# Patient Record
Sex: FEMALE | Race: OTHER
[De-identification: names, ages, dates, MRNs, and addresses within clinical notes are randomized per-mention and may not be internally consistent; named-entity substitution may affect disease eponyms.]

---

## 2019-07-08 ENCOUNTER — HOSPITAL ENCOUNTER (INPATIENT)
Dept: HOSPITAL 15 - ER | Age: 44
LOS: 3 days | Discharge: HOME | DRG: 872 | End: 2019-07-11
Attending: FAMILY MEDICINE | Admitting: NURSE PRACTITIONER
Payer: COMMERCIAL

## 2019-07-08 VITALS — DIASTOLIC BLOOD PRESSURE: 98 MMHG | SYSTOLIC BLOOD PRESSURE: 158 MMHG

## 2019-07-08 VITALS — DIASTOLIC BLOOD PRESSURE: 97 MMHG | SYSTOLIC BLOOD PRESSURE: 172 MMHG

## 2019-07-08 VITALS — SYSTOLIC BLOOD PRESSURE: 158 MMHG | DIASTOLIC BLOOD PRESSURE: 98 MMHG

## 2019-07-08 VITALS — WEIGHT: 293 LBS | HEIGHT: 64 IN | BODY MASS INDEX: 50.02 KG/M2

## 2019-07-08 DIAGNOSIS — J01.90: ICD-10-CM

## 2019-07-08 DIAGNOSIS — Z87.01: ICD-10-CM

## 2019-07-08 DIAGNOSIS — Z88.1: ICD-10-CM

## 2019-07-08 DIAGNOSIS — J45.909: ICD-10-CM

## 2019-07-08 DIAGNOSIS — A41.9: Primary | ICD-10-CM

## 2019-07-08 DIAGNOSIS — E11.9: ICD-10-CM

## 2019-07-08 DIAGNOSIS — E66.01: ICD-10-CM

## 2019-07-08 DIAGNOSIS — E78.00: ICD-10-CM

## 2019-07-08 DIAGNOSIS — I10: ICD-10-CM

## 2019-07-08 DIAGNOSIS — B95.62: ICD-10-CM

## 2019-07-08 DIAGNOSIS — Z79.84: ICD-10-CM

## 2019-07-08 DIAGNOSIS — L03.211: ICD-10-CM

## 2019-07-08 DIAGNOSIS — J32.0: ICD-10-CM

## 2019-07-08 LAB
ALBUMIN SERPL-MCNC: 3.3 G/DL (ref 3.4–5)
ALP SERPL-CCNC: 78 U/L (ref 45–117)
ALT SERPL-CCNC: 26 U/L (ref 13–56)
ANION GAP SERPL CALCULATED.3IONS-SCNC: 8 MMOL/L (ref 5–15)
BILIRUB SERPL-MCNC: 0.4 MG/DL (ref 0.2–1)
BUN SERPL-MCNC: 13 MG/DL (ref 7–18)
BUN/CREAT SERPL: 16.3
CALCIUM SERPL-MCNC: 8.4 MG/DL (ref 8.5–10.1)
CHLORIDE SERPL-SCNC: 103 MMOL/L (ref 98–107)
CO2 SERPL-SCNC: 26 MMOL/L (ref 21–32)
GLUCOSE SERPL-MCNC: 350 MG/DL (ref 74–106)
HCT VFR BLD AUTO: 41.9 % (ref 36–46)
HGB BLD-MCNC: 13.7 G/DL (ref 12.2–16.2)
LACTATE PLASV-SCNC: 2.7 MMOL/L (ref 0.4–2)
MCH RBC QN AUTO: 28.4 PG (ref 28–32)
MCV RBC AUTO: 86.7 FL (ref 80–100)
NRBC BLD QL AUTO: 0.1 %
POTASSIUM SERPL-SCNC: 4.2 MMOL/L (ref 3.5–5.1)
PROT SERPL-MCNC: 7 G/DL (ref 6.4–8.2)
SODIUM SERPL-SCNC: 137 MMOL/L (ref 136–145)

## 2019-07-08 PROCEDURE — 81025 URINE PREGNANCY TEST: CPT

## 2019-07-08 PROCEDURE — 70450 CT HEAD/BRAIN W/O DYE: CPT

## 2019-07-08 PROCEDURE — 83605 ASSAY OF LACTIC ACID: CPT

## 2019-07-08 PROCEDURE — 87077 CULTURE AEROBIC IDENTIFY: CPT

## 2019-07-08 PROCEDURE — 96372 THER/PROPH/DIAG INJ SC/IM: CPT

## 2019-07-08 PROCEDURE — 83036 HEMOGLOBIN GLYCOSYLATED A1C: CPT

## 2019-07-08 PROCEDURE — 87186 SC STD MICRODIL/AGAR DIL: CPT

## 2019-07-08 PROCEDURE — 87040 BLOOD CULTURE FOR BACTERIA: CPT

## 2019-07-08 PROCEDURE — 80053 COMPREHEN METABOLIC PANEL: CPT

## 2019-07-08 PROCEDURE — 70486 CT MAXILLOFACIAL W/O DYE: CPT

## 2019-07-08 PROCEDURE — 96365 THER/PROPH/DIAG IV INF INIT: CPT

## 2019-07-08 PROCEDURE — 82962 GLUCOSE BLOOD TEST: CPT

## 2019-07-08 PROCEDURE — 81001 URINALYSIS AUTO W/SCOPE: CPT

## 2019-07-08 PROCEDURE — 36415 COLL VENOUS BLD VENIPUNCTURE: CPT

## 2019-07-08 PROCEDURE — 82565 ASSAY OF CREATININE: CPT

## 2019-07-08 PROCEDURE — 85025 COMPLETE CBC W/AUTO DIFF WBC: CPT

## 2019-07-08 PROCEDURE — 87205 SMEAR GRAM STAIN: CPT

## 2019-07-08 PROCEDURE — 80061 LIPID PANEL: CPT

## 2019-07-08 PROCEDURE — 80202 ASSAY OF VANCOMYCIN: CPT

## 2019-07-08 RX ADMIN — Medication SCH STRIP: at 22:15

## 2019-07-08 RX ADMIN — MORPHINE SULFATE PRN MG: 2 INJECTION, SOLUTION INTRAMUSCULAR; INTRAVENOUS at 20:57

## 2019-07-08 RX ADMIN — HYDROCODONE BITARTRATE AND ACETAMINOPHEN PRN TAB: 5; 325 TABLET ORAL at 22:28

## 2019-07-08 RX ADMIN — DOCUSATE SODIUM SCH MG: 100 CAPSULE, LIQUID FILLED ORAL at 22:00

## 2019-07-08 RX ADMIN — HUMAN INSULIN SCH UNITS: 100 INJECTION, SOLUTION SUBCUTANEOUS at 22:16

## 2019-07-08 RX ADMIN — ONDANSETRON HYDROCHLORIDE PRN MG: 2 INJECTION, SOLUTION INTRAMUSCULAR; INTRAVENOUS at 20:57

## 2019-07-08 RX ADMIN — SODIUM CHLORIDE SCH MLS/HR: 0.9 INJECTION, SOLUTION INTRAVENOUS at 03:19

## 2019-07-08 NOTE — NUR
Telemetry admit from ER

KEYLA FONG admitted to Telemetry unit after SBAR received.  Patient oriented to 
Hardik Colunga, primary RN, unit, room, bed, and unit policies regarding patient care and 
visiting hours. Patient now on continuous telemetry monitoring, tele box # 47 and telemetry 
reading on arrival to unit is sinus rhythm. Patient weighed by bedscale and encouraged to 
call if they need something. All questions and concerns addressed, patient verbalized 
understanding.

## 2019-07-08 NOTE — NUR
RT NOTE: PT ASSESSED FOR PRN MED NEB TX, PT STATED SHE TAKES TX AT HOME FOR ASTHMA AS NEEDED 
WHEN SICK. PT DENIES SOB AT THIS TIME. PT ON ROOM AIR SPO2 99%, HR 91, RR 18. BS CLEAR. PT 
NOTIFIED OF MED NEB TX AND TO HAVE RT PAGED IF SOB OCCURS.

## 2019-07-09 VITALS — DIASTOLIC BLOOD PRESSURE: 70 MMHG | SYSTOLIC BLOOD PRESSURE: 128 MMHG

## 2019-07-09 VITALS — DIASTOLIC BLOOD PRESSURE: 79 MMHG | SYSTOLIC BLOOD PRESSURE: 151 MMHG

## 2019-07-09 VITALS — DIASTOLIC BLOOD PRESSURE: 84 MMHG | SYSTOLIC BLOOD PRESSURE: 148 MMHG

## 2019-07-09 VITALS — SYSTOLIC BLOOD PRESSURE: 160 MMHG | DIASTOLIC BLOOD PRESSURE: 85 MMHG

## 2019-07-09 LAB
ALBUMIN SERPL-MCNC: 2.8 G/DL (ref 3.4–5)
ALP SERPL-CCNC: 65 U/L (ref 45–117)
ALT SERPL-CCNC: 19 U/L (ref 13–56)
ANION GAP SERPL CALCULATED.3IONS-SCNC: 8 MMOL/L (ref 5–15)
BILIRUB SERPL-MCNC: 0.7 MG/DL (ref 0.2–1)
BUN SERPL-MCNC: 13 MG/DL (ref 7–18)
BUN/CREAT SERPL: 21
CALCIUM SERPL-MCNC: 7.9 MG/DL (ref 8.5–10.1)
CHLORIDE SERPL-SCNC: 102 MMOL/L (ref 98–107)
CHOLEST SERPL-MCNC: 175 MG/DL (ref ?–200)
CO2 SERPL-SCNC: 27 MMOL/L (ref 21–32)
GLUCOSE SERPL-MCNC: 181 MG/DL (ref 74–106)
HCT VFR BLD AUTO: 36.6 % (ref 36–46)
HDLC SERPL-MCNC: 50 MG/DL (ref 40–59)
HGB BLD-MCNC: 12.1 G/DL (ref 12.2–16.2)
MCH RBC QN AUTO: 28.8 PG (ref 28–32)
MCV RBC AUTO: 87.4 FL (ref 80–100)
NRBC BLD QL AUTO: 0.1 %
POTASSIUM SERPL-SCNC: 3.5 MMOL/L (ref 3.5–5.1)
PROT SERPL-MCNC: 5.9 G/DL (ref 6.4–8.2)
SODIUM SERPL-SCNC: 137 MMOL/L (ref 136–145)
TRIGL SERPL-MCNC: 204 MG/DL (ref ?–150)

## 2019-07-09 RX ADMIN — FAMOTIDINE SCH MG: 20 TABLET, FILM COATED ORAL at 08:51

## 2019-07-09 RX ADMIN — DEXTROSE SCH MLS/HR: 5 SOLUTION INTRAVENOUS at 00:18

## 2019-07-09 RX ADMIN — DOCUSATE SODIUM SCH MG: 100 CAPSULE, LIQUID FILLED ORAL at 21:52

## 2019-07-09 RX ADMIN — LEVOFLOXACIN SCH MLS/HR: 5 INJECTION, SOLUTION INTRAVENOUS at 12:41

## 2019-07-09 RX ADMIN — ONDANSETRON HYDROCHLORIDE PRN MG: 2 INJECTION, SOLUTION INTRAMUSCULAR; INTRAVENOUS at 13:46

## 2019-07-09 RX ADMIN — ACETAMINOPHEN PRN MG: 500 TABLET ORAL at 22:01

## 2019-07-09 RX ADMIN — HYDROCODONE BITARTRATE AND ACETAMINOPHEN PRN TAB: 5; 325 TABLET ORAL at 06:46

## 2019-07-09 RX ADMIN — Medication SCH STRIP: at 17:23

## 2019-07-09 RX ADMIN — HYDROCODONE BITARTRATE AND ACETAMINOPHEN PRN TAB: 5; 325 TABLET ORAL at 12:10

## 2019-07-09 RX ADMIN — HYDROCODONE BITARTRATE AND ACETAMINOPHEN PRN TAB: 5; 325 TABLET ORAL at 17:57

## 2019-07-09 RX ADMIN — MORPHINE SULFATE PRN MG: 2 INJECTION, SOLUTION INTRAMUSCULAR; INTRAVENOUS at 04:03

## 2019-07-09 RX ADMIN — HUMAN INSULIN SCH UNITS: 100 INJECTION, SOLUTION SUBCUTANEOUS at 06:46

## 2019-07-09 RX ADMIN — HUMAN INSULIN SCH UNITS: 100 INJECTION, SOLUTION SUBCUTANEOUS at 12:07

## 2019-07-09 RX ADMIN — HUMAN INSULIN SCH UNITS: 100 INJECTION, SOLUTION SUBCUTANEOUS at 22:02

## 2019-07-09 RX ADMIN — DEXTROSE SCH MLS/HR: 5 SOLUTION INTRAVENOUS at 21:52

## 2019-07-09 RX ADMIN — MORPHINE SULFATE PRN MG: 2 INJECTION, SOLUTION INTRAMUSCULAR; INTRAVENOUS at 08:50

## 2019-07-09 RX ADMIN — SODIUM CHLORIDE SCH MLS/HR: 0.9 INJECTION, SOLUTION INTRAVENOUS at 05:45

## 2019-07-09 RX ADMIN — Medication SCH STRIP: at 22:02

## 2019-07-09 RX ADMIN — DEXTROSE SCH MLS/HR: 5 SOLUTION INTRAVENOUS at 10:11

## 2019-07-09 RX ADMIN — SODIUM CHLORIDE SCH MLS/HR: 0.9 INJECTION, SOLUTION INTRAVENOUS at 16:21

## 2019-07-09 RX ADMIN — DOCUSATE SODIUM SCH MG: 100 CAPSULE, LIQUID FILLED ORAL at 08:55

## 2019-07-09 RX ADMIN — Medication SCH STRIP: at 06:46

## 2019-07-09 RX ADMIN — ONDANSETRON HYDROCHLORIDE PRN MG: 2 INJECTION, SOLUTION INTRAMUSCULAR; INTRAVENOUS at 04:04

## 2019-07-09 RX ADMIN — Medication SCH STRIP: at 12:07

## 2019-07-09 RX ADMIN — HUMAN INSULIN SCH UNITS: 100 INJECTION, SOLUTION SUBCUTANEOUS at 17:23

## 2019-07-09 NOTE — NUR
Opening Shift Note

Received report from Denise BAKER. Assumed care of patient, awake and alert, family at 
bedside.  No S/S of distress/SOB or pain.  Instructed on POC and to call for assist PRN, 
will continue to monitor for changes Q1hr and PRN.

## 2019-07-09 NOTE — NUR
ASSESSED PT FOR PRN MED NEB TX, PT IN NO RESPIRATORY DISTRESS. HR 89 ON RA WITH SPO2 98% 
WITH CLEAR BS. NO TX NEEDED AT THIS TIME. WILL CONTINUE TO MONITOR PT.

## 2019-07-09 NOTE — NUR
IV removal

IV line to left FA swollen and painful. IV DC'd with clean sterile technique, catheter fully 
intact. Pressure dressing applied to site. Patient tolerated well.

NOTE

Two attempts to reinsert unsuccessful. Phone call placed to charge RN.

## 2019-07-09 NOTE — NUR
Opening Shift Note

Assumed care of patient, awake and alert. No S/S of distress/SOB. Instructed on POC and to 
call for assist PRN, will continue to monitor for changes Q1hr and PRN.

## 2019-07-09 NOTE — NUR
PRN MED NEB ASSESSMENT. PT DENIES SOB. NO DISTRESS NOTED AT THIS TIME. RA POX 95%  HR 80 RR 
18. BS CLEAR AND DIMINISHED. TX NOT GIVEN.

## 2019-07-10 VITALS — SYSTOLIC BLOOD PRESSURE: 135 MMHG | DIASTOLIC BLOOD PRESSURE: 97 MMHG

## 2019-07-10 VITALS — SYSTOLIC BLOOD PRESSURE: 135 MMHG | DIASTOLIC BLOOD PRESSURE: 82 MMHG

## 2019-07-10 VITALS — SYSTOLIC BLOOD PRESSURE: 153 MMHG | DIASTOLIC BLOOD PRESSURE: 88 MMHG

## 2019-07-10 VITALS — SYSTOLIC BLOOD PRESSURE: 137 MMHG | DIASTOLIC BLOOD PRESSURE: 75 MMHG

## 2019-07-10 VITALS — SYSTOLIC BLOOD PRESSURE: 145 MMHG | DIASTOLIC BLOOD PRESSURE: 81 MMHG

## 2019-07-10 VITALS — SYSTOLIC BLOOD PRESSURE: 135 MMHG | DIASTOLIC BLOOD PRESSURE: 75 MMHG

## 2019-07-10 LAB
HCT VFR BLD AUTO: 38.6 % (ref 36–46)
HGB BLD-MCNC: 12.8 G/DL (ref 12.2–16.2)
MCH RBC QN AUTO: 28.9 PG (ref 28–32)
MCV RBC AUTO: 87.4 FL (ref 80–100)
NRBC BLD QL AUTO: 0 %

## 2019-07-10 RX ADMIN — Medication SCH STRIP: at 06:58

## 2019-07-10 RX ADMIN — Medication SCH STRIP: at 17:30

## 2019-07-10 RX ADMIN — DEXTROSE SCH MLS/HR: 5 SOLUTION INTRAVENOUS at 17:30

## 2019-07-10 RX ADMIN — Medication SCH STRIP: at 11:56

## 2019-07-10 RX ADMIN — DOCUSATE SODIUM SCH MG: 100 CAPSULE, LIQUID FILLED ORAL at 21:06

## 2019-07-10 RX ADMIN — DOCUSATE SODIUM SCH MG: 100 CAPSULE, LIQUID FILLED ORAL at 09:45

## 2019-07-10 RX ADMIN — LEVOFLOXACIN SCH MLS/HR: 5 INJECTION, SOLUTION INTRAVENOUS at 11:56

## 2019-07-10 RX ADMIN — SODIUM CHLORIDE SCH MLS/HR: 0.9 INJECTION, SOLUTION INTRAVENOUS at 03:00

## 2019-07-10 RX ADMIN — HUMAN INSULIN SCH UNITS: 100 INJECTION, SOLUTION SUBCUTANEOUS at 21:08

## 2019-07-10 RX ADMIN — FAMOTIDINE SCH MG: 20 TABLET, FILM COATED ORAL at 09:45

## 2019-07-10 RX ADMIN — HYDROCODONE BITARTRATE AND ACETAMINOPHEN PRN TAB: 5; 325 TABLET ORAL at 21:05

## 2019-07-10 RX ADMIN — SODIUM CHLORIDE SCH MLS/HR: 0.9 INJECTION, SOLUTION INTRAVENOUS at 11:56

## 2019-07-10 RX ADMIN — HUMAN INSULIN SCH UNITS: 100 INJECTION, SOLUTION SUBCUTANEOUS at 11:56

## 2019-07-10 RX ADMIN — DEXTROSE SCH MLS/HR: 5 SOLUTION INTRAVENOUS at 09:44

## 2019-07-10 RX ADMIN — Medication SCH STRIP: at 21:08

## 2019-07-10 RX ADMIN — HUMAN INSULIN SCH UNITS: 100 INJECTION, SOLUTION SUBCUTANEOUS at 06:59

## 2019-07-10 RX ADMIN — ACETAMINOPHEN PRN MG: 500 TABLET ORAL at 14:12

## 2019-07-10 RX ADMIN — HUMAN INSULIN SCH UNITS: 100 INJECTION, SOLUTION SUBCUTANEOUS at 17:30

## 2019-07-10 NOTE — NUR
WOUND CARE NOTE: IN TO SEE PATIENT AT THIS TIME PER WOUND CARE CONSULT REQUEST. PATIENT WAS 
NOTED UPON ADMIT TO HAVE WOUND TO RIGHT NARES.  WOUND PHOTOGRAPHED AT THAT TIME BY BEDSIDE 
NURSE FOR REFERENCE.  PATIENT ADMITTED TO [facility] WITH DIAGNOSIS OF HEAD PAIN. PATIENT HAS 
CURRENT BRETT SCORE OF 21.  PATIENT HAS ONE WEEK HISTORY WITH WOUND TO THE RIGHT NOSE.  
WOUND IS WORSENING, SHE PRESENTED TO THE ER FOR TREATMENT.  PATIENT'S WOUND TO RIGHT NARES 
HAS BEEN CULTURED, AND IS IN PROCESS WITH LAB.  WOUND APPEARS AS LOCALIZED SKIN INFECTION. 
NOSE AND BILATERAL CHEEKS ARE ERYTHEMIC, EDEMATOUS.  THERE IS A CRUSTED BROWN WOUND TO THE 
RIGHT NARE.  WOUND IS NOT OPEN OR DRAINING.  WOUND IS VERY TENDER TO THE TOUCH UPON 
PALPATION.  LEFT WOUND OPEN TO AIR.  RECOMMEND: PRN DRESSING TO RIGHT NOSE WOUND IF 
OPEN/DRAINING, SKIN/WOUND CARE PLAN, DIETARY CONSULT. WILL DEFER ALL OTHER RECOMMENDATIONS 
TO HOSPITALIST AT THIS TIME. NO FURTHER WOUND CARE NEEDED.

-------------------------------------------------------------------------------

Addendum: 07/10/19 at 1532 by Bhavna Viramontes RN

-------------------------------------------------------------------------------

Amended: Links added.

## 2019-07-10 NOTE — NUR
IV removal

IV DC'd with clean sterile technique due to complaint of site pain, catheter fully intact. 
Pressure dressing applied to site. Patient tolerated well. Pt has new left hand 22 IV 
inserted after two attempts, patient tolerated well

NOTE:

## 2019-07-10 NOTE — NUR
Opening Shift Note

Assumed care of patient, awake and alert.  No S/S of distress/SOB or pain.  Instructed on 
POC and to call for assistance PRN, will continue to monitor for changes Q1hr and PRN.

## 2019-07-10 NOTE — NUR
Respiratory note:

AT BEDSIDE TO ASSESS FOR PRN TX, NO S/S OF DISTRESS NOTED, BS ARE CLEAR T/O, NO TX INDICATED 
AT THIS TIME. WILL CONTINUE TO MONITOR AS NEEDED. PT AWARE TO HAVE ME PAGED AT ANY TIME SHE 
HAS ANY CONCERN WITH HER BREATHING.

## 2019-07-10 NOTE — NUR
Pictures Taken/Wound Consult

Pictures were taken of the sore on right nare and wound consult was placed. The sore was 
initially more of a pimple with redness that spread to her right cheek. The pimple popped 
last night and a wound culture was taken. It is still draining. The redness has spread to 
her left cheek.

## 2019-07-11 VITALS — DIASTOLIC BLOOD PRESSURE: 100 MMHG | SYSTOLIC BLOOD PRESSURE: 153 MMHG

## 2019-07-11 VITALS — SYSTOLIC BLOOD PRESSURE: 152 MMHG | DIASTOLIC BLOOD PRESSURE: 77 MMHG

## 2019-07-11 VITALS — DIASTOLIC BLOOD PRESSURE: 80 MMHG | SYSTOLIC BLOOD PRESSURE: 156 MMHG

## 2019-07-11 RX ADMIN — HUMAN INSULIN SCH UNITS: 100 INJECTION, SOLUTION SUBCUTANEOUS at 06:19

## 2019-07-11 RX ADMIN — HYDROCODONE BITARTRATE AND ACETAMINOPHEN PRN TAB: 5; 325 TABLET ORAL at 06:19

## 2019-07-11 RX ADMIN — DEXTROSE SCH MLS/HR: 5 SOLUTION INTRAVENOUS at 09:44

## 2019-07-11 RX ADMIN — HUMAN INSULIN SCH UNITS: 100 INJECTION, SOLUTION SUBCUTANEOUS at 11:32

## 2019-07-11 RX ADMIN — SODIUM CHLORIDE SCH MLS/HR: 0.9 INJECTION, SOLUTION INTRAVENOUS at 02:16

## 2019-07-11 RX ADMIN — Medication SCH STRIP: at 06:19

## 2019-07-11 RX ADMIN — FAMOTIDINE SCH MG: 20 TABLET, FILM COATED ORAL at 09:45

## 2019-07-11 RX ADMIN — LEVOFLOXACIN SCH MLS/HR: 5 INJECTION, SOLUTION INTRAVENOUS at 12:00

## 2019-07-11 RX ADMIN — DOCUSATE SODIUM SCH MG: 100 CAPSULE, LIQUID FILLED ORAL at 09:44

## 2019-07-11 RX ADMIN — Medication SCH STRIP: at 11:33

## 2019-07-11 RX ADMIN — DEXTROSE SCH MLS/HR: 5 SOLUTION INTRAVENOUS at 02:16

## 2019-07-11 RX ADMIN — SODIUM CHLORIDE SCH MLS/HR: 0.9 INJECTION, SOLUTION INTRAVENOUS at 08:55

## 2019-07-11 NOTE — NUR
Discharge instructions given as ordered. Encourage to follow up with PMD as instructed. All 
questions and concerns addressed. Patient verbalized understanding.  Medication 
reconciliation form completed and copy given to patient. Home medications held in Pharmacy 
returned to patient, and needed vaccines given. IV removed with catheter intact, pressure 
dressing applied.  Telemetry unit returned to ICU. Patient taken to vehicle via wheelchair 
with all personal belongings, accompanied by staff and family member. No distress noted at 
time of departure.

## 2019-07-11 NOTE — NUR
Respiratory note: ASSESSED PATIENT FOR PRN BREATHING TX. NO TX WAS GIVEN AT THIS TIME. 
PATIENT WAS AWAKE AND ALERT, NO RESPIRATORY DISTRESS NOTED AT THIS TIME. PATIENT STATED SHE 
FELT SLIGHTLY OUT OF BREATH. I SUGGESTED PATIENT RECEIVES BREATHING TX, AND PATIENT STATED 
IF SHE NEEDS ONE SHE KNOWS TO PAGE RT. PATIENT RR 18, HR 88, SPO2 98% ON ROOM AIR. WILL 
CONTINUE TO MONITOR PATIENT.

## 2022-04-03 ENCOUNTER — HOSPITAL ENCOUNTER (EMERGENCY)
Dept: HOSPITAL 15 - ER | Age: 47
Discharge: HOME | End: 2022-04-03
Payer: COMMERCIAL

## 2022-04-03 VITALS — SYSTOLIC BLOOD PRESSURE: 140 MMHG | DIASTOLIC BLOOD PRESSURE: 95 MMHG

## 2022-04-03 VITALS — BODY MASS INDEX: 46.95 KG/M2 | WEIGHT: 275 LBS | HEIGHT: 64 IN

## 2022-04-03 DIAGNOSIS — E11.9: ICD-10-CM

## 2022-04-03 DIAGNOSIS — R10.9: Primary | ICD-10-CM

## 2022-04-03 DIAGNOSIS — M79.10: ICD-10-CM

## 2022-04-03 DIAGNOSIS — Z88.1: ICD-10-CM

## 2022-04-03 LAB
ALBUMIN SERPL-MCNC: 3.6 G/DL (ref 3.4–5)
ALP SERPL-CCNC: 89 U/L (ref 45–117)
ALT SERPL-CCNC: 36 U/L (ref 13–56)
ANION GAP SERPL CALCULATED.3IONS-SCNC: 8 MMOL/L (ref 5–15)
BILIRUB SERPL-MCNC: 0.4 MG/DL (ref 0.2–1)
BUN SERPL-MCNC: 10 MG/DL (ref 7–18)
BUN/CREAT SERPL: 12.3
CALCIUM SERPL-MCNC: 8.4 MG/DL (ref 8.5–10.1)
CHLORIDE SERPL-SCNC: 104 MMOL/L (ref 98–107)
CO2 SERPL-SCNC: 24 MMOL/L (ref 21–32)
GLUCOSE SERPL-MCNC: 336 MG/DL (ref 74–106)
HCT VFR BLD AUTO: 43.3 % (ref 36–46)
HGB BLD-MCNC: 15 G/DL (ref 12.2–16.2)
MCH RBC QN AUTO: 31 PG (ref 28–32)
MCV RBC AUTO: 89.6 FL (ref 80–100)
NRBC BLD QL AUTO: 0.1 %
POTASSIUM SERPL-SCNC: 3.9 MMOL/L (ref 3.5–5.1)
PROT SERPL-MCNC: 7.2 G/DL (ref 6.4–8.2)
SODIUM SERPL-SCNC: 136 MMOL/L (ref 136–145)

## 2022-04-03 PROCEDURE — 81025 URINE PREGNANCY TEST: CPT

## 2022-04-03 PROCEDURE — 96372 THER/PROPH/DIAG INJ SC/IM: CPT

## 2022-04-03 PROCEDURE — 80053 COMPREHEN METABOLIC PANEL: CPT

## 2022-04-03 PROCEDURE — 99284 EMERGENCY DEPT VISIT MOD MDM: CPT

## 2022-04-03 PROCEDURE — 81001 URINALYSIS AUTO W/SCOPE: CPT

## 2022-04-03 PROCEDURE — 74176 CT ABD & PELVIS W/O CONTRAST: CPT

## 2022-04-03 PROCEDURE — 85025 COMPLETE CBC W/AUTO DIFF WBC: CPT

## 2022-04-03 PROCEDURE — 36415 COLL VENOUS BLD VENIPUNCTURE: CPT

## 2023-04-01 ENCOUNTER — HOSPITAL ENCOUNTER (EMERGENCY)
Dept: HOSPITAL 15 - ER | Age: 48
Discharge: HOME | End: 2023-04-01
Payer: COMMERCIAL

## 2023-04-01 VITALS — HEIGHT: 64 IN | BODY MASS INDEX: 43.1 KG/M2 | WEIGHT: 252.43 LBS

## 2023-04-01 VITALS — SYSTOLIC BLOOD PRESSURE: 122 MMHG | DIASTOLIC BLOOD PRESSURE: 80 MMHG

## 2023-04-01 DIAGNOSIS — Z79.899: ICD-10-CM

## 2023-04-01 DIAGNOSIS — E11.9: ICD-10-CM

## 2023-04-01 DIAGNOSIS — N76.0: ICD-10-CM

## 2023-04-01 DIAGNOSIS — B00.9: Primary | ICD-10-CM

## 2023-04-01 DIAGNOSIS — Z79.84: ICD-10-CM

## 2023-04-01 DIAGNOSIS — Z88.8: ICD-10-CM
